# Patient Record
Sex: FEMALE | Race: WHITE | Employment: OTHER | URBAN - NONMETROPOLITAN AREA
[De-identification: names, ages, dates, MRNs, and addresses within clinical notes are randomized per-mention and may not be internally consistent; named-entity substitution may affect disease eponyms.]

---

## 2021-02-19 ENCOUNTER — APPOINTMENT (OUTPATIENT)
Dept: CT IMAGING | Age: 51
End: 2021-02-19
Payer: COMMERCIAL

## 2021-02-19 ENCOUNTER — HOSPITAL ENCOUNTER (EMERGENCY)
Age: 51
Discharge: ANOTHER HOSPITAL | End: 2021-02-19
Attending: FAMILY MEDICINE
Payer: COMMERCIAL

## 2021-02-19 VITALS
WEIGHT: 155 LBS | OXYGEN SATURATION: 99 % | HEART RATE: 58 BPM | RESPIRATION RATE: 20 BRPM | TEMPERATURE: 98.1 F | SYSTOLIC BLOOD PRESSURE: 133 MMHG | DIASTOLIC BLOOD PRESSURE: 70 MMHG

## 2021-02-19 DIAGNOSIS — G89.18 POSTOPERATIVE ABDOMINAL PAIN: ICD-10-CM

## 2021-02-19 DIAGNOSIS — D72.829 LEUKOCYTOSIS, UNSPECIFIED TYPE: Primary | ICD-10-CM

## 2021-02-19 DIAGNOSIS — R10.9 POSTOPERATIVE ABDOMINAL PAIN: ICD-10-CM

## 2021-02-19 LAB
-: ABNORMAL
ABSOLUTE BANDS #: 0.21 K/UL (ref 0–1)
ABSOLUTE EOS #: ABNORMAL K/UL (ref 0–0.4)
ABSOLUTE EOS #: ABNORMAL K/UL (ref 0–0.4)
ABSOLUTE IMMATURE GRANULOCYTE: ABNORMAL K/UL (ref 0–0.3)
ABSOLUTE IMMATURE GRANULOCYTE: ABNORMAL K/UL (ref 0–0.3)
ABSOLUTE LYMPH #: 1.6 K/UL (ref 1–4.8)
ABSOLUTE LYMPH #: 2.08 K/UL (ref 1–4.8)
ABSOLUTE MONO #: 0.6 K/UL (ref 0–1)
ABSOLUTE MONO #: 1.25 K/UL (ref 0–1)
ALBUMIN SERPL-MCNC: 3.9 G/DL (ref 3.5–5.2)
ALBUMIN SERPL-MCNC: 4.4 G/DL (ref 3.5–5.2)
ALBUMIN/GLOBULIN RATIO: ABNORMAL (ref 1–2.5)
ALBUMIN/GLOBULIN RATIO: NORMAL (ref 1–2.5)
ALP BLD-CCNC: 52 U/L (ref 35–104)
ALP BLD-CCNC: 59 U/L (ref 35–104)
ALT SERPL-CCNC: 6 U/L (ref 5–33)
ALT SERPL-CCNC: 6 U/L (ref 5–33)
AMORPHOUS: ABNORMAL
ANION GAP SERPL CALCULATED.3IONS-SCNC: 10 MMOL/L (ref 9–17)
ANION GAP SERPL CALCULATED.3IONS-SCNC: 11 MMOL/L (ref 9–17)
AST SERPL-CCNC: 11 U/L
AST SERPL-CCNC: 13 U/L
BACTERIA: ABNORMAL
BANDS: 1 % (ref 0–10)
BASOPHILS # BLD: ABNORMAL % (ref 0–2)
BASOPHILS # BLD: ABNORMAL % (ref 0–2)
BASOPHILS ABSOLUTE: ABNORMAL K/UL (ref 0–0.2)
BASOPHILS ABSOLUTE: ABNORMAL K/UL (ref 0–0.2)
BILIRUB SERPL-MCNC: 0.46 MG/DL (ref 0.3–1.2)
BILIRUB SERPL-MCNC: 0.55 MG/DL (ref 0.3–1.2)
BILIRUBIN DIRECT: <0.08 MG/DL
BILIRUBIN URINE: NEGATIVE
BILIRUBIN, INDIRECT: NORMAL MG/DL (ref 0–1)
BUN BLDV-MCNC: 17 MG/DL (ref 6–20)
BUN BLDV-MCNC: 19 MG/DL (ref 6–20)
BUN/CREAT BLD: 28 (ref 9–20)
BUN/CREAT BLD: 33 (ref 9–20)
CALCIUM SERPL-MCNC: 9 MG/DL (ref 8.6–10.4)
CALCIUM SERPL-MCNC: 9.7 MG/DL (ref 8.6–10.4)
CASTS UA: ABNORMAL /LPF
CHLORIDE BLD-SCNC: 102 MMOL/L (ref 98–107)
CHLORIDE BLD-SCNC: 104 MMOL/L (ref 98–107)
CO2: 23 MMOL/L (ref 20–31)
CO2: 24 MMOL/L (ref 20–31)
COLOR: YELLOW
COMMENT UA: ABNORMAL
CREAT SERPL-MCNC: 0.51 MG/DL (ref 0.5–0.9)
CREAT SERPL-MCNC: 0.68 MG/DL (ref 0.5–0.9)
CRYSTALS, UA: ABNORMAL /HPF
DIFFERENTIAL TYPE: ABNORMAL
DIFFERENTIAL TYPE: ABNORMAL
EOSINOPHILS RELATIVE PERCENT: ABNORMAL % (ref 0–5)
EOSINOPHILS RELATIVE PERCENT: ABNORMAL % (ref 0–5)
EPITHELIAL CELLS UA: ABNORMAL /HPF
GFR AFRICAN AMERICAN: >60 ML/MIN
GFR AFRICAN AMERICAN: >60 ML/MIN
GFR NON-AFRICAN AMERICAN: >60 ML/MIN
GFR NON-AFRICAN AMERICAN: >60 ML/MIN
GFR SERPL CREATININE-BSD FRML MDRD: ABNORMAL ML/MIN/{1.73_M2}
GLOBULIN: NORMAL G/DL (ref 1.5–3.8)
GLUCOSE BLD-MCNC: 115 MG/DL (ref 70–99)
GLUCOSE BLD-MCNC: 140 MG/DL (ref 70–99)
GLUCOSE URINE: NEGATIVE
HCT VFR BLD CALC: 42 % (ref 36–46)
HCT VFR BLD CALC: 44.9 % (ref 36–46)
HEMOGLOBIN: 14.5 G/DL (ref 12–16)
HEMOGLOBIN: 15.2 G/DL (ref 12–16)
IMMATURE GRANULOCYTES: ABNORMAL %
IMMATURE GRANULOCYTES: ABNORMAL %
KETONES, URINE: ABNORMAL
LACTIC ACID: 0.8 MMOL/L (ref 0.5–2.2)
LACTIC ACID: 1.1 MMOL/L (ref 0.5–2.2)
LEUKOCYTE ESTERASE, URINE: NEGATIVE
LIPASE: 63 U/L (ref 13–60)
LIPASE: 78 U/L (ref 13–60)
LYMPHOCYTES # BLD: 10 % (ref 15–40)
LYMPHOCYTES # BLD: 8 % (ref 15–40)
MAGNESIUM: 1.8 MG/DL (ref 1.6–2.6)
MCH RBC QN AUTO: 29.8 PG (ref 26–34)
MCH RBC QN AUTO: 30.5 PG (ref 26–34)
MCHC RBC AUTO-ENTMCNC: 33.8 G/DL (ref 31–37)
MCHC RBC AUTO-ENTMCNC: 34.6 G/DL (ref 31–37)
MCV RBC AUTO: 87.9 FL (ref 80–100)
MCV RBC AUTO: 88.1 FL (ref 80–100)
MONOCYTES # BLD: 3 % (ref 4–8)
MONOCYTES # BLD: 6 % (ref 4–8)
MORPHOLOGY: ABNORMAL
MORPHOLOGY: ABNORMAL
MUCUS: ABNORMAL
NITRITE, URINE: NEGATIVE
NRBC AUTOMATED: ABNORMAL PER 100 WBC
NRBC AUTOMATED: ABNORMAL PER 100 WBC
OTHER OBSERVATIONS UA: ABNORMAL
PDW BLD-RTO: 13.6 % (ref 12.1–15.2)
PDW BLD-RTO: 13.7 % (ref 12.1–15.2)
PH UA: 6 (ref 5–8)
PLATELET # BLD: 332 K/UL (ref 140–450)
PLATELET # BLD: 403 K/UL (ref 140–450)
PLATELET ESTIMATE: ABNORMAL
PLATELET ESTIMATE: ABNORMAL
PMV BLD AUTO: ABNORMAL FL (ref 6–12)
PMV BLD AUTO: ABNORMAL FL (ref 6–12)
POTASSIUM SERPL-SCNC: 3.3 MMOL/L (ref 3.7–5.3)
POTASSIUM SERPL-SCNC: 3.8 MMOL/L (ref 3.7–5.3)
PROTEIN UA: ABNORMAL
RBC # BLD: 4.78 M/UL (ref 4–5.2)
RBC # BLD: 5.1 M/UL (ref 4–5.2)
RBC # BLD: ABNORMAL 10*6/UL
RBC # BLD: ABNORMAL 10*6/UL
RBC UA: ABNORMAL /HPF (ref 0–2)
RENAL EPITHELIAL, UA: ABNORMAL /HPF
SARS-COV-2, RAPID: NOT DETECTED
SEG NEUTROPHILS: 83 % (ref 47–75)
SEG NEUTROPHILS: 89 % (ref 47–75)
SEGMENTED NEUTROPHILS ABSOLUTE COUNT: 17.26 K/UL (ref 2.5–7)
SEGMENTED NEUTROPHILS ABSOLUTE COUNT: 17.8 K/UL (ref 2.5–7)
SODIUM BLD-SCNC: 137 MMOL/L (ref 135–144)
SODIUM BLD-SCNC: 137 MMOL/L (ref 135–144)
SPECIFIC GRAVITY UA: 1.01 (ref 1–1.03)
SPECIMEN DESCRIPTION: NORMAL
TOTAL PROTEIN: 6.3 G/DL (ref 6.4–8.3)
TOTAL PROTEIN: 7.2 G/DL (ref 6.4–8.3)
TRICHOMONAS: ABNORMAL
TURBIDITY: CLEAR
URINE HGB: ABNORMAL
UROBILINOGEN, URINE: NORMAL
WBC # BLD: 20 K/UL (ref 3.5–11)
WBC # BLD: 20.8 K/UL (ref 3.5–11)
WBC # BLD: ABNORMAL 10*3/UL
WBC # BLD: ABNORMAL 10*3/UL
WBC UA: ABNORMAL /HPF
YEAST: ABNORMAL

## 2021-02-19 PROCEDURE — 80053 COMPREHEN METABOLIC PANEL: CPT

## 2021-02-19 PROCEDURE — 96376 TX/PRO/DX INJ SAME DRUG ADON: CPT

## 2021-02-19 PROCEDURE — C9803 HOPD COVID-19 SPEC COLLECT: HCPCS

## 2021-02-19 PROCEDURE — 80076 HEPATIC FUNCTION PANEL: CPT

## 2021-02-19 PROCEDURE — 81001 URINALYSIS AUTO W/SCOPE: CPT

## 2021-02-19 PROCEDURE — 80048 BASIC METABOLIC PNL TOTAL CA: CPT

## 2021-02-19 PROCEDURE — 2580000003 HC RX 258: Performed by: FAMILY MEDICINE

## 2021-02-19 PROCEDURE — 85025 COMPLETE CBC W/AUTO DIFF WBC: CPT

## 2021-02-19 PROCEDURE — 83605 ASSAY OF LACTIC ACID: CPT

## 2021-02-19 PROCEDURE — 36415 COLL VENOUS BLD VENIPUNCTURE: CPT

## 2021-02-19 PROCEDURE — 6360000004 HC RX CONTRAST MEDICATION: Performed by: FAMILY MEDICINE

## 2021-02-19 PROCEDURE — 96365 THER/PROPH/DIAG IV INF INIT: CPT

## 2021-02-19 PROCEDURE — 96375 TX/PRO/DX INJ NEW DRUG ADDON: CPT

## 2021-02-19 PROCEDURE — 74177 CT ABD & PELVIS W/CONTRAST: CPT

## 2021-02-19 PROCEDURE — 83690 ASSAY OF LIPASE: CPT

## 2021-02-19 PROCEDURE — U0002 COVID-19 LAB TEST NON-CDC: HCPCS

## 2021-02-19 PROCEDURE — 6360000002 HC RX W HCPCS: Performed by: FAMILY MEDICINE

## 2021-02-19 PROCEDURE — 99284 EMERGENCY DEPT VISIT MOD MDM: CPT

## 2021-02-19 PROCEDURE — 83735 ASSAY OF MAGNESIUM: CPT

## 2021-02-19 RX ORDER — FENTANYL CITRATE 50 UG/ML
50 INJECTION, SOLUTION INTRAMUSCULAR; INTRAVENOUS ONCE
Status: COMPLETED | OUTPATIENT
Start: 2021-02-19 | End: 2021-02-19

## 2021-02-19 RX ORDER — FENTANYL CITRATE 50 UG/ML
50 INJECTION, SOLUTION INTRAMUSCULAR; INTRAVENOUS
Status: DISCONTINUED | OUTPATIENT
Start: 2021-02-19 | End: 2021-02-19 | Stop reason: HOSPADM

## 2021-02-19 RX ORDER — ONDANSETRON 2 MG/ML
4 INJECTION INTRAMUSCULAR; INTRAVENOUS ONCE
Status: COMPLETED | OUTPATIENT
Start: 2021-02-19 | End: 2021-02-19

## 2021-02-19 RX ORDER — KETOROLAC TROMETHAMINE 30 MG/ML
30 INJECTION, SOLUTION INTRAMUSCULAR; INTRAVENOUS ONCE
Status: COMPLETED | OUTPATIENT
Start: 2021-02-19 | End: 2021-02-19

## 2021-02-19 RX ORDER — SODIUM CHLORIDE 9 MG/ML
1000 INJECTION, SOLUTION INTRAVENOUS CONTINUOUS
Status: DISCONTINUED | OUTPATIENT
Start: 2021-02-19 | End: 2021-02-19 | Stop reason: HOSPADM

## 2021-02-19 RX ORDER — PANTOPRAZOLE SODIUM 20 MG/1
20 TABLET, DELAYED RELEASE ORAL DAILY
COMMUNITY
Start: 2020-12-29

## 2021-02-19 RX ORDER — PREGABALIN 75 MG/1
75 CAPSULE ORAL 3 TIMES DAILY
COMMUNITY
Start: 2020-12-01 | End: 2021-09-23

## 2021-02-19 RX ORDER — LISINOPRIL 10 MG/1
10 TABLET ORAL DAILY
COMMUNITY
Start: 2020-12-01

## 2021-02-19 RX ADMIN — FENTANYL CITRATE 50 MCG: 50 INJECTION, SOLUTION INTRAMUSCULAR; INTRAVENOUS at 14:28

## 2021-02-19 RX ADMIN — FENTANYL CITRATE 50 MCG: 50 INJECTION, SOLUTION INTRAMUSCULAR; INTRAVENOUS at 12:35

## 2021-02-19 RX ADMIN — KETOROLAC TROMETHAMINE 30 MG: 30 INJECTION, SOLUTION INTRAMUSCULAR at 09:14

## 2021-02-19 RX ADMIN — FENTANYL CITRATE 50 MCG: 50 INJECTION, SOLUTION INTRAMUSCULAR; INTRAVENOUS at 10:18

## 2021-02-19 RX ADMIN — PIPERACILLIN SODIUM,TAZOBACTAM SODIUM 3375 MG: 3; .375 INJECTION, POWDER, FOR SOLUTION INTRAVENOUS at 15:54

## 2021-02-19 RX ADMIN — FENTANYL CITRATE 50 MCG: 50 INJECTION, SOLUTION INTRAMUSCULAR; INTRAVENOUS at 11:13

## 2021-02-19 RX ADMIN — IOPAMIDOL 75 ML: 755 INJECTION, SOLUTION INTRAVENOUS at 10:05

## 2021-02-19 RX ADMIN — FENTANYL CITRATE 50 MCG: 50 INJECTION, SOLUTION INTRAMUSCULAR; INTRAVENOUS at 15:39

## 2021-02-19 RX ADMIN — ONDANSETRON 4 MG: 2 INJECTION INTRAMUSCULAR; INTRAVENOUS at 09:14

## 2021-02-19 RX ADMIN — PIPERACILLIN SODIUM AND TAZOBACTAM SODIUM 3375 MG: 3; .375 INJECTION, POWDER, LYOPHILIZED, FOR SOLUTION INTRAVENOUS at 10:20

## 2021-02-19 RX ADMIN — FENTANYL CITRATE 50 MCG: 50 INJECTION, SOLUTION INTRAMUSCULAR; INTRAVENOUS at 13:28

## 2021-02-19 RX ADMIN — SODIUM CHLORIDE 1000 ML: 9 INJECTION, SOLUTION INTRAVENOUS at 11:02

## 2021-02-19 ASSESSMENT — PAIN DESCRIPTION - PAIN TYPE: TYPE: ACUTE PAIN

## 2021-02-19 ASSESSMENT — ENCOUNTER SYMPTOMS
DIARRHEA: 0
NAUSEA: 1
ABDOMINAL PAIN: 1
VOMITING: 1

## 2021-02-19 ASSESSMENT — PAIN SCALES - GENERAL
PAINLEVEL_OUTOF10: 9
PAINLEVEL_OUTOF10: 9
PAINLEVEL_OUTOF10: 8

## 2021-02-19 NOTE — ED PROVIDER NOTES
[facility]  eMERGENCY dEPARTMENT eNCOUnter          CHIEF COMPLAINT       Chief Complaint   Patient presents with    Abdominal Pain     had an ERCP yesterday at [facility] by Dr. Rene Patrick - pain started at midnight        Nurses Notes reviewed and I agree except as noted in the HPI. HISTORY OF PRESENT ILLNESS    Tian Flores is a 45 y.o. female who presents the emergency room via EMS from home, patient planing of right-sided abdominal pain, onset over the past several hours, with some vomiting, and fever T-max 101.2. Patient rates her pain 10 out of 10, cramping and discomfort, continuous. Patient is POD 1 from ERCP with stent placement at [facility] through Dr. Rene Patrick, patient states she tried to contact his office but provider was not available, and was told to go to the emergency room. PCP: Back  GI: [facility] in [place], [place]      REVIEW OF SYSTEMS     Review of Systems   Constitutional: Positive for fever. Gastrointestinal: Positive for abdominal pain, nausea and vomiting. Negative for diarrhea. All other systems reviewed and are negative. PAST MEDICAL HISTORY    has a past medical history of Anxiety. SURGICAL HISTORY      has a past surgical history that includes Tubal ligation; Hemorrhoid surgery; ERCP; and Hysterectomy. CURRENT MEDICATIONS       Previous Medications    BLACK COHOSH 20 MG TABS    Take 20 mg by mouth 2 times daily    LISINOPRIL (PRINIVIL;ZESTRIL) 10 MG TABLET    Take 10 mg by mouth daily    PANTOPRAZOLE (PROTONIX) 20 MG TABLET    Take 20 mg by mouth daily    PREGABALIN (LYRICA) 75 MG CAPSULE    Take 75 mg by mouth 3 times daily. ALLERGIES     is allergic to morphine; depakote [valproic acid]; sulfamethoxazole-trimethoprim; and vicodin [hydrocodone-acetaminophen]. FAMILY HISTORY     She indicated that the status of her mother is unknown.  She indicated that the status of her father is unknown.   family history includes Diabetes in her father and mother; Heart Disease in her father. SOCIAL HISTORY      reports that she has been smoking. She has been smoking about 0.50 packs per day. She has never used smokeless tobacco. She reports current alcohol use. She reports previous drug use. PHYSICAL EXAM     INITIAL VITALS:  weight is 155 lb (70.3 kg). Her temperature is 98.1 °F (36.7 °C). Her blood pressure is 175/84 (abnormal) and her pulse is 58. Her respiration is 20 and oxygen saturation is 98%. Physical Exam   Constitutional: Patient is oriented to person, place, and time. Patient appears well-developed and well-nourished. Patient is active and cooperative. HENT:   Head: Normocephalic and atraumatic. Head is without contusion. Right Ear: Hearing and external ear normal. No drainage. Left Ear: Hearing and external ear normal. No drainage. Nose: Nose normal. No nasal deformity. No epistaxis. Mouth/Throat: Mucous membranes are not dry. Eyes: EOMI. Conjunctivae, sclera, and lids are normal. Right eye exhibits no discharge. Left eye exhibits no discharge. Neck: Full passive range of motion without pain and phonation normal.   Cardiovascular:  Normal rate, regular rhythm and intact distal pulses. Pulses: Right radial pulse  2+   Pulmonary/Chest: Effort normal. No tachypnea and no bradypnea. Abdominal: Soft, active mild distention of the right side of abdomen, generalized tenderness with voluntary guarding  Musculoskeletal:   Negative acute trauma or deformity,  apparent full range of motion and normal strength all extremities appropriate to age. Neurological: Patient is alert and oriented to person, place, and time. patient displays no tremor. Patient displays no seizure activity. .    Skin: Skin is warm and dry. Patient is not diaphoretic. Psychiatric: Patient has a normal mood and slight anxious affect.  Patient speech is normal and behavior is normal. Cognition and memory are normal.    DIFFERENTIAL DIAGNOSIS:   Abscess, stent migration, cholecystitis, choledocholithiasis, ascending cholangitis,    DIAGNOSTIC RESULTS           RADIOLOGY: non-plain film images(s) such as CT, Ultrasound and MRI are read by the radiologist.  CT ABDOMEN PELVIS W IV CONTRAST Additional Contrast? None   Final Result      The common bile duct is distended, and the stent is seen only in the very    distal portion of the duct. This implies that the stent is not optimally    placed. The appearance of the biliary system is otherwise not unexpected after    the procedure. Scattered free fluid in the right abdomen and minimally in the cul-de-sac. This    is of uncertain etiology and clinical significance. Perforation of the gut as    felt to be unlikely given the lack of any free air. Hepatomegaly with diffuse fatty change in the liver. Mild cardiomegaly.       Left ovarian cyst.                      LABS:   Labs Reviewed   CBC WITH AUTO DIFFERENTIAL - Abnormal; Notable for the following components:       Result Value    WBC 20.8 (*)     Seg Neutrophils 83 (*)     Lymphocytes 10 (*)     Segs Absolute 17.26 (*)     Absolute Mono # 1.25 (*)     All other components within normal limits   BASIC METABOLIC PANEL W/ REFLEX TO MG FOR LOW K - Abnormal; Notable for the following components:    Glucose 140 (*)     Bun/Cre Ratio 28 (*)     All other components within normal limits   LIPASE - Abnormal; Notable for the following components:    Lipase 78 (*)     All other components within normal limits   URINALYSIS - Abnormal; Notable for the following components:    Ketones, Urine SMALL (*)     Urine Hgb 1+ (*)     Protein, UA TRACE (*)     All other components within normal limits   MICROSCOPIC URINALYSIS - Abnormal; Notable for the following components:    Bacteria, UA RARE (*)     Mucus, UA 2+ (*)     All other components within normal limits   COVID-19, RAPID   HEPATIC FUNCTION PANEL   LACTIC ACID       EMERGENCY DEPARTMENT COURSE:   Vitals:    Vitals: 02/19/21 1003 02/19/21 1019 02/19/21 1033 02/19/21 1048   BP: (!) 182/78 (!) 161/87 (!) 177/84 (!) 175/84   Pulse:       Resp:       Temp:       TempSrc:       SpO2: 99% 100% 98% 98%   Weight:         Patient history and physical exam taken at bedside, discussed patient symptoms and exam findings, discussed initial work-up to include blood and urine studies, IV access, and would like to get CT abdomen pelvis this patient is postop day 1 from intra-abdominal procedure. Confirmed allergies, will give Zofran 4 mg IV for nausea, ketorolac 30 mg IV for initial pain, noting patient history of use of Subsolv. Initial lab reviewed noting white blood cell count 20.8 with 83% PMNs no reported bandemia, remaining labs pending    CT abdomen pelvis with IV contrast ordered    Additional labs reviewed, noting SCR 0.68, normal electrolytes, normal Paddock function panel, lipase 78, lactic 1.1    Discussed with patient her initial labs, concern for elevated white blood cell count, that we would be go ahead get the CT, patient acknowledges    Upon return from CT, patient abdominal pain again increasing, we discussed pain medication, patient last dose of subsolve was yesterday, we discussed it is possible she could have a withdrawal reaction we gave her medication here, though patient acknowledges this risk is willing to get medication, reviewed allergies, will give fentanyl 50 mcg IV x1    Normal saline 100 cc an hour maintenance fluids ordered     CT radiology report reviewed    Case was discussed with Dr. Mildred Rahman at [facility], regarding patient's presentation and current work-up including CT findings, advises to have patient transferred to either [facility] or [facility] as there is concern for either early abscess or early perforation, and that surgery at their facility would not be able to handle this.     Discussed with patient my conversation with her GI physician, with recommendation for transfer, patient had a phone call and opted to go to [facility], will contact [facility] for transfer    Case was discussed with [facility] transfer center, regarding patient's presentation and current work-up, follow-up phone call patient is excepted through the ER, the ER accepting is Dr. Krystal Roman, and the GI accepting is Dr. Babar Sheikh. Patient returned from the bathroom, again having severe abdominal pain, did place standing orders for fentanyl 50 mcg every 1 hours as needed moderate to severe abdominal pain. I also placed a time dose for Zosyn 6 hours after initial dosing. Second set of labs reviewed, WBC 20.0 with a 9% PMNs no reported bands, 0.51, , K3.3, lipase 63, lactic 0.8    EMS arrival, given report, we will run the second bag of attics while patient is in route to the next hospital, patient packaged for transport        FINAL IMPRESSION      1. Leukocytosis, unspecified type    2. Postoperative abdominal pain          DISPOSITION/PLAN   trn    PATIENT REFERRED TO:  No follow-up provider specified.     DISCHARGE MEDICATIONS:  New Prescriptions    No medications on file           Summation      Patient Course: trn    ED Medications administered this visit:    Medications   0.9 % sodium chloride infusion (1,000 mLs Intravenous New Bag 2/19/21 1102)   fentaNYL (SUBLIMAZE) injection 50 mcg (50 mcg Intravenous Given 2/19/21 1235)   piperacillin-tazobactam (ZOSYN) 3,375 mg in dextrose 5 % 50 mL IVPB (mini-bag) (has no administration in time range)   ondansetron (ZOFRAN) injection 4 mg (4 mg Intravenous Given 2/19/21 0914)   ketorolac (TORADOL) injection 30 mg (30 mg Intravenous Given 2/19/21 0914)   piperacillin-tazobactam (ZOSYN) 3,375 mg in dextrose 5 % 50 mL IVPB (mini-bag) (0 mg Intravenous Stopped 2/19/21 1055)   iopamidol (ISOVUE-370) 76 % injection 75 mL (75 mLs Intravenous Given 2/19/21 1005)   fentaNYL (SUBLIMAZE) injection 50 mcg (50 mcg Intravenous Given 2/19/21 1018)   fentaNYL (SUBLIMAZE) injection 50 mcg (50 mcg Intravenous Given 2/19/21 1113)       New Prescriptions from this visit:    New Prescriptions    No medications on file       Follow-up:  No follow-up provider specified. Final Impression:   1. Leukocytosis, unspecified type    2.  Postoperative abdominal pain               (Please note that portions of this note were completed with a voice recognition program.  Efforts were made to edit the dictations but occasionally words are mis-transcribed.)    MD Radha Lange MD  02/19/21 6996

## 2021-03-16 ENCOUNTER — HOSPITAL ENCOUNTER (EMERGENCY)
Age: 51
Discharge: ANOTHER HOSPITAL | End: 2021-03-16
Attending: FAMILY MEDICINE
Payer: COMMERCIAL

## 2021-03-16 ENCOUNTER — APPOINTMENT (OUTPATIENT)
Dept: CT IMAGING | Age: 51
End: 2021-03-16
Payer: COMMERCIAL

## 2021-03-16 VITALS
HEART RATE: 94 BPM | BODY MASS INDEX: 24.33 KG/M2 | SYSTOLIC BLOOD PRESSURE: 115 MMHG | HEIGHT: 67 IN | TEMPERATURE: 98.2 F | OXYGEN SATURATION: 97 % | WEIGHT: 155 LBS | DIASTOLIC BLOOD PRESSURE: 70 MMHG | RESPIRATION RATE: 18 BRPM

## 2021-03-16 DIAGNOSIS — K85.90 PHLEGMON OF PANCREAS: Primary | ICD-10-CM

## 2021-03-16 LAB
-: ABNORMAL
ABSOLUTE EOS #: 0.1 K/UL (ref 0–0.4)
ABSOLUTE IMMATURE GRANULOCYTE: ABNORMAL K/UL (ref 0–0.3)
ABSOLUTE LYMPH #: 1.8 K/UL (ref 1–4.8)
ABSOLUTE MONO #: 0.8 K/UL (ref 0–1)
ALBUMIN SERPL-MCNC: 3.8 G/DL (ref 3.5–5.2)
ALBUMIN/GLOBULIN RATIO: ABNORMAL (ref 1–2.5)
ALP BLD-CCNC: 90 U/L (ref 35–104)
ALT SERPL-CCNC: <5 U/L (ref 5–33)
AMORPHOUS: ABNORMAL
ANION GAP SERPL CALCULATED.3IONS-SCNC: 11 MMOL/L (ref 9–17)
AST SERPL-CCNC: 9 U/L
BACTERIA: ABNORMAL
BASOPHILS # BLD: 0 % (ref 0–2)
BASOPHILS ABSOLUTE: 0 K/UL (ref 0–0.2)
BILIRUB SERPL-MCNC: 0.47 MG/DL (ref 0.3–1.2)
BILIRUBIN DIRECT: <0.08 MG/DL
BILIRUBIN URINE: NEGATIVE
BILIRUBIN, INDIRECT: ABNORMAL MG/DL (ref 0–1)
BUN BLDV-MCNC: 14 MG/DL (ref 6–20)
BUN/CREAT BLD: 25 (ref 9–20)
CALCIUM SERPL-MCNC: 9 MG/DL (ref 8.6–10.4)
CASTS UA: ABNORMAL /LPF
CHLORIDE BLD-SCNC: 99 MMOL/L (ref 98–107)
CO2: 26 MMOL/L (ref 20–31)
COLOR: YELLOW
COMMENT UA: ABNORMAL
CREAT SERPL-MCNC: 0.56 MG/DL (ref 0.5–0.9)
CRYSTALS, UA: ABNORMAL /HPF
DIFFERENTIAL TYPE: YES
EOSINOPHILS RELATIVE PERCENT: 0 % (ref 0–5)
EPITHELIAL CELLS UA: ABNORMAL /HPF
GFR AFRICAN AMERICAN: >60 ML/MIN
GFR NON-AFRICAN AMERICAN: >60 ML/MIN
GFR SERPL CREATININE-BSD FRML MDRD: ABNORMAL ML/MIN/{1.73_M2}
GFR SERPL CREATININE-BSD FRML MDRD: ABNORMAL ML/MIN/{1.73_M2}
GLOBULIN: ABNORMAL G/DL (ref 1.5–3.8)
GLUCOSE BLD-MCNC: 103 MG/DL (ref 70–99)
GLUCOSE URINE: NEGATIVE
HCT VFR BLD CALC: 38.8 % (ref 36–46)
HEMOGLOBIN: 13.1 G/DL (ref 12–16)
IMMATURE GRANULOCYTES: ABNORMAL %
KETONES, URINE: NEGATIVE
LACTIC ACID: 0.7 MMOL/L (ref 0.5–2.2)
LEUKOCYTE ESTERASE, URINE: NEGATIVE
LIPASE: 23 U/L (ref 13–60)
LYMPHOCYTES # BLD: 12 % (ref 15–40)
MCH RBC QN AUTO: 29.2 PG (ref 26–34)
MCHC RBC AUTO-ENTMCNC: 33.7 G/DL (ref 31–37)
MCV RBC AUTO: 86.7 FL (ref 80–100)
MONOCYTES # BLD: 5 % (ref 4–8)
MUCUS: ABNORMAL
NITRITE, URINE: NEGATIVE
NRBC AUTOMATED: ABNORMAL PER 100 WBC
OTHER OBSERVATIONS UA: ABNORMAL
PDW BLD-RTO: 13.7 % (ref 12.1–15.2)
PH UA: 5 (ref 5–8)
PLATELET # BLD: 484 K/UL (ref 140–450)
PLATELET ESTIMATE: ABNORMAL
PMV BLD AUTO: ABNORMAL FL (ref 6–12)
POTASSIUM SERPL-SCNC: 4 MMOL/L (ref 3.7–5.3)
PROTEIN UA: NEGATIVE
RBC # BLD: 4.48 M/UL (ref 4–5.2)
RBC # BLD: ABNORMAL 10*6/UL
RBC UA: ABNORMAL /HPF (ref 0–2)
RENAL EPITHELIAL, UA: ABNORMAL /HPF
SARS-COV-2, RAPID: NOT DETECTED
SEG NEUTROPHILS: 83 % (ref 47–75)
SEGMENTED NEUTROPHILS ABSOLUTE COUNT: 12.1 K/UL (ref 2.5–7)
SODIUM BLD-SCNC: 136 MMOL/L (ref 135–144)
SPECIFIC GRAVITY UA: 1.01 (ref 1–1.03)
SPECIMEN DESCRIPTION: NORMAL
TOTAL PROTEIN: 7.5 G/DL (ref 6.4–8.3)
TRICHOMONAS: ABNORMAL
TURBIDITY: CLEAR
URINE HGB: ABNORMAL
UROBILINOGEN, URINE: NORMAL
WBC # BLD: 14.9 K/UL (ref 3.5–11)
WBC # BLD: ABNORMAL 10*3/UL
WBC UA: ABNORMAL /HPF
YEAST: ABNORMAL

## 2021-03-16 PROCEDURE — 74177 CT ABD & PELVIS W/CONTRAST: CPT

## 2021-03-16 PROCEDURE — 96376 TX/PRO/DX INJ SAME DRUG ADON: CPT

## 2021-03-16 PROCEDURE — U0002 COVID-19 LAB TEST NON-CDC: HCPCS

## 2021-03-16 PROCEDURE — 96375 TX/PRO/DX INJ NEW DRUG ADDON: CPT

## 2021-03-16 PROCEDURE — 85025 COMPLETE CBC W/AUTO DIFF WBC: CPT

## 2021-03-16 PROCEDURE — 99285 EMERGENCY DEPT VISIT HI MDM: CPT

## 2021-03-16 PROCEDURE — 83690 ASSAY OF LIPASE: CPT

## 2021-03-16 PROCEDURE — 87086 URINE CULTURE/COLONY COUNT: CPT

## 2021-03-16 PROCEDURE — 80076 HEPATIC FUNCTION PANEL: CPT

## 2021-03-16 PROCEDURE — 6360000002 HC RX W HCPCS: Performed by: FAMILY MEDICINE

## 2021-03-16 PROCEDURE — 96374 THER/PROPH/DIAG INJ IV PUSH: CPT

## 2021-03-16 PROCEDURE — 83605 ASSAY OF LACTIC ACID: CPT

## 2021-03-16 PROCEDURE — 81001 URINALYSIS AUTO W/SCOPE: CPT

## 2021-03-16 PROCEDURE — 80048 BASIC METABOLIC PNL TOTAL CA: CPT

## 2021-03-16 PROCEDURE — 36415 COLL VENOUS BLD VENIPUNCTURE: CPT

## 2021-03-16 PROCEDURE — 6360000004 HC RX CONTRAST MEDICATION: Performed by: FAMILY MEDICINE

## 2021-03-16 PROCEDURE — C9803 HOPD COVID-19 SPEC COLLECT: HCPCS

## 2021-03-16 PROCEDURE — 2580000003 HC RX 258: Performed by: FAMILY MEDICINE

## 2021-03-16 RX ORDER — SODIUM CHLORIDE 9 MG/ML
1000 INJECTION, SOLUTION INTRAVENOUS CONTINUOUS
Status: DISCONTINUED | OUTPATIENT
Start: 2021-03-16 | End: 2021-03-16 | Stop reason: HOSPADM

## 2021-03-16 RX ORDER — FENTANYL CITRATE 50 UG/ML
50 INJECTION, SOLUTION INTRAMUSCULAR; INTRAVENOUS ONCE
Status: COMPLETED | OUTPATIENT
Start: 2021-03-16 | End: 2021-03-16

## 2021-03-16 RX ORDER — FENTANYL CITRATE 50 UG/ML
100 INJECTION, SOLUTION INTRAMUSCULAR; INTRAVENOUS ONCE
Status: COMPLETED | OUTPATIENT
Start: 2021-03-16 | End: 2021-03-16

## 2021-03-16 RX ORDER — FENTANYL CITRATE 50 UG/ML
100 INJECTION, SOLUTION INTRAMUSCULAR; INTRAVENOUS ONCE
Status: DISCONTINUED | OUTPATIENT
Start: 2021-03-16 | End: 2021-03-16

## 2021-03-16 RX ORDER — FENTANYL CITRATE 50 UG/ML
50 INJECTION, SOLUTION INTRAMUSCULAR; INTRAVENOUS
Status: DISCONTINUED | OUTPATIENT
Start: 2021-03-16 | End: 2021-03-16

## 2021-03-16 RX ORDER — 0.9 % SODIUM CHLORIDE 0.9 %
1000 INTRAVENOUS SOLUTION INTRAVENOUS ONCE
Status: COMPLETED | OUTPATIENT
Start: 2021-03-16 | End: 2021-03-16

## 2021-03-16 RX ORDER — ONDANSETRON 2 MG/ML
4 INJECTION INTRAMUSCULAR; INTRAVENOUS ONCE
Status: COMPLETED | OUTPATIENT
Start: 2021-03-16 | End: 2021-03-16

## 2021-03-16 RX ORDER — PREGABALIN 75 MG/1
75 CAPSULE ORAL 3 TIMES DAILY
COMMUNITY
Start: 2020-12-01

## 2021-03-16 RX ADMIN — FENTANYL CITRATE 50 MCG: 50 INJECTION, SOLUTION INTRAMUSCULAR; INTRAVENOUS at 14:19

## 2021-03-16 RX ADMIN — SODIUM CHLORIDE 1000 ML: 9 INJECTION, SOLUTION INTRAVENOUS at 11:19

## 2021-03-16 RX ADMIN — ONDANSETRON 4 MG: 2 INJECTION INTRAMUSCULAR; INTRAVENOUS at 11:22

## 2021-03-16 RX ADMIN — SODIUM CHLORIDE 1000 ML: 9 INJECTION, SOLUTION INTRAVENOUS at 18:29

## 2021-03-16 RX ADMIN — SODIUM CHLORIDE 1000 ML: 9 INJECTION, SOLUTION INTRAVENOUS at 12:52

## 2021-03-16 RX ADMIN — IOPAMIDOL 75 ML: 755 INJECTION, SOLUTION INTRAVENOUS at 13:05

## 2021-03-16 RX ADMIN — FENTANYL CITRATE 100 MCG: 50 INJECTION, SOLUTION INTRAMUSCULAR; INTRAVENOUS at 11:22

## 2021-03-16 RX ADMIN — HYDROMORPHONE HYDROCHLORIDE 1 MG: 1 INJECTION, SOLUTION INTRAMUSCULAR; INTRAVENOUS; SUBCUTANEOUS at 18:34

## 2021-03-16 RX ADMIN — HYDROMORPHONE HYDROCHLORIDE 1 MG: 1 INJECTION, SOLUTION INTRAMUSCULAR; INTRAVENOUS; SUBCUTANEOUS at 21:47

## 2021-03-16 RX ADMIN — HYDROMORPHONE HYDROCHLORIDE 1 MG: 1 INJECTION, SOLUTION INTRAMUSCULAR; INTRAVENOUS; SUBCUTANEOUS at 20:19

## 2021-03-16 RX ADMIN — FENTANYL CITRATE 50 MCG: 50 INJECTION, SOLUTION INTRAMUSCULAR; INTRAVENOUS at 12:12

## 2021-03-16 RX ADMIN — FENTANYL CITRATE 50 MCG: 50 INJECTION, SOLUTION INTRAMUSCULAR; INTRAVENOUS at 16:34

## 2021-03-16 ASSESSMENT — PAIN DESCRIPTION - PAIN TYPE
TYPE: ACUTE PAIN

## 2021-03-16 ASSESSMENT — PAIN SCALES - GENERAL
PAINLEVEL_OUTOF10: 8
PAINLEVEL_OUTOF10: 8
PAINLEVEL_OUTOF10: 10
PAINLEVEL_OUTOF10: 9
PAINLEVEL_OUTOF10: 10
PAINLEVEL_OUTOF10: 10
PAINLEVEL_OUTOF10: 8
PAINLEVEL_OUTOF10: 9

## 2021-03-16 ASSESSMENT — PAIN DESCRIPTION - DESCRIPTORS: DESCRIPTORS: CONSTANT

## 2021-03-16 ASSESSMENT — PAIN DESCRIPTION - PROGRESSION
CLINICAL_PROGRESSION: NOT CHANGED
CLINICAL_PROGRESSION: GRADUALLY WORSENING

## 2021-03-16 ASSESSMENT — ENCOUNTER SYMPTOMS
ABDOMINAL PAIN: 1
VOMITING: 1
DIARRHEA: 0
NAUSEA: 1

## 2021-03-16 ASSESSMENT — PAIN DESCRIPTION - LOCATION
LOCATION: ABDOMEN
LOCATION: ABDOMEN

## 2021-03-16 ASSESSMENT — PAIN DESCRIPTION - FREQUENCY
FREQUENCY: CONTINUOUS
FREQUENCY: CONTINUOUS

## 2021-03-16 NOTE — ED PROVIDER NOTES
[facility]  eMERGENCY dEPARTMENT eNCOUnter          [address]       Chief Complaint   Patient presents with    Abdominal Pain     lower abd pain that is getting worse       Nurses Notes reviewed and I agree except as noted in the HPI. HISTORY OF PRESENT ILLNESS    Cheri Mancilla is a 45 y.o. female who presents to the emergency room via private vehicle, patient planing of abdominal pain decaying across her lower abdomen, nausea and dry heaves. Patient states she had a procedure done 2/18 with a bile duct stent placed at [facility], states that it became displaced she was seen in the emergency room next day and was found that the stent had displaced, patient was transferred to [facility] where she states she received IV fluids, potassium and magnesium, pain medication, states the stent did pass on its own 2/25. Patient states that they now want her to go to [place] for an additional procedure though she states she had not gotten her preop work-up performed as she thought they were supposed to call her. Patient says initially the pain got better though now is getting much worse, rating the pain 10-15/10, indicating primarily through the lower abdomen throughout throughout the entire abdomen, patient states nausea with dry heaves, patient states she is not really had any bowel movement since 2/25 when she passed the stent which she states was very painful. Patient states she tried a suppository without relief. Nothing is helped her pain. PCP: PACO Segura Dr: Ana Ring, [facility]      REVIEW OF SYSTEMS     Review of Systems   Gastrointestinal: Positive for abdominal pain, nausea and vomiting (dry heaves). Negative for diarrhea. All other systems reviewed and are negative. PAST MEDICAL HISTORY    has a past medical history of Anxiety. SURGICAL HISTORY      has a past surgical history that includes Tubal ligation;  Hemorrhoid surgery; ERCP; and Hysterectomy. CURRENT MEDICATIONS       Discharge Medication List as of 3/16/2021  9:50 PM      CONTINUE these medications which have NOT CHANGED    Details   pregabalin (LYRICA) 75 MG capsule Take 75 mg by mouth three times daily. Historical Med      lisinopril (PRINIVIL;ZESTRIL) 10 MG tablet Take 10 mg by mouth dailyHistorical Med      pantoprazole (PROTONIX) 20 MG tablet Take 20 mg by mouth dailyHistorical Med             ALLERGIES     is allergic to morphine; topiramate; depakote [valproic acid]; naproxen; sulfamethoxazole-trimethoprim; and vicodin [hydrocodone-acetaminophen]. FAMILY HISTORY     She indicated that the status of her mother is unknown. She indicated that the status of her father is unknown.   family history includes Diabetes in her father and mother; Heart Disease in her father. SOCIAL HISTORY      reports that she has been smoking. She has been smoking about 0.50 packs per day. She has never used smokeless tobacco. She reports current alcohol use. She reports previous drug use. PHYSICAL EXAM     INITIAL VITALS:  height is 5' 7\" (1.702 m) and weight is 155 lb (70.3 kg). Her temperature is 98.2 °F (36.8 °C). Her blood pressure is 115/70 and her pulse is 94. Her respiration is 18 and oxygen saturation is 97%. Physical Exam   Constitutional: Patient is oriented to person, place, and time. Patient appears well-developed and well-nourished. Patient is active and cooperative. HENT:   Head: Normocephalic and atraumatic. Head is without contusion. Right Ear: Hearing and external ear normal. No drainage. Left Ear: Hearing and external ear normal. No drainage. Nose: Nose normal. No nasal deformity. No epistaxis. Mouth/Throat: Mucous membranes are not dry. Eyes: EOMI. Conjunctivae, sclera, and lids are normal. Right eye exhibits no discharge. Left eye exhibits no discharge.    Neck: Full passive range of motion without pain and phonation normal.   Cardiovascular:  Normal rate, biliary stent. No free air             LABS:   Labs Reviewed   CBC WITH AUTO DIFFERENTIAL - Abnormal; Notable for the following components:       Result Value    WBC 14.9 (*)     Platelets 467 (*)     Seg Neutrophils 83 (*)     Lymphocytes 12 (*)     Segs Absolute 12.10 (*)     All other components within normal limits   BASIC METABOLIC PANEL W/ REFLEX TO MG FOR LOW K - Abnormal; Notable for the following components:    Glucose 103 (*)     Bun/Cre Ratio 25 (*)     All other components within normal limits   HEPATIC FUNCTION PANEL - Abnormal; Notable for the following components:    ALT <5 (*)     All other components within normal limits   URINALYSIS - Abnormal; Notable for the following components:    Urine Hgb 1+ (*)     All other components within normal limits   MICROSCOPIC URINALYSIS - Abnormal; Notable for the following components:    Bacteria, UA 2+ (*)     All other components within normal limits   COVID-19, RAPID   CULTURE, URINE   LACTIC ACID   LIPASE       EMERGENCY DEPARTMENT COURSE:   Vitals:    Vitals:    03/16/21 1603 03/16/21 1837 03/16/21 1903 03/16/21 2131   BP: 118/74 125/70 109/69 115/70   Pulse:       Resp:       Temp:       SpO2: 99% 100% 97%    Weight:       Height:         Patient history and physical exam taken at bedside, discussed patient symptoms and exam findings, discussed initial plan work-up to get IV access, blood and urine studies, will give IV fentanyl and IV Zofran for pain and nausea, IV fluid bolus, and reassess. Would like to get kidney function prior to any scan as patient describes not taking in much orally. Patient sitting semi-Fowlers in bed with significant other at bedside.     OARRS = 580, prescription for subsolve 2/26 for 30-day supply, prescription for oxycodone 5 mg #25 2/24    Fentanyl 100 mcg IV and Zofran 4 mg IV ordered    EMR reviewed, noting [facility] notes, patient was discharged with \"other acute pancreatitis, unspecified complication status\", 2/19-2/24 admission. EMR reviewed, with notes noted for opioid dependence, acute pancreatitis, chronic migraine, patient is a smoker. Initial labs reviewed noting WBC 14.9 with 83% PMNs no reported bands, remaining labs pending    Patient reporting no improvement in her pain, reviewed patient's blood pressure systolic 469, will order additional 50 mcg fentanyl IV x1    Lactic acid 0.7, remaining labs pending    Additional labs reviewed noting normal pack function test, normal lipase    Patient continued pain, additional pain medication ordered    Discussed with patient her current lab findings, I do have concerns for patient's continued abdominal pain, after further discussion we will get CT abdomen pelvis with IV contrast.    Radiology report reviewed    Discussed patient radiology report findings, would like to try to discuss case with her GI group, patient acknowledges    Case was discussed with Dr. Sophie Stanley, [facility] GI, regarding patient's presentation current work-up and a CT findings, advised patient needs transfer to higher care hospital as she is likely need for surgery for this phlegmon, would advise a [facility] trauma [facility] hospital due to the complexity    Case was discussed with Dr. Latoya Dan, surgery at this facility, regarding patient's presentation and work-up including CT findings, also agrees patient should be transferred to higher care facility due to complexity    Discussed the patient her CT findings and my conversations with both GI and surgery, recommendation for transfer, patient states she does not want to go back to [facility], is okay with going to [place] [address], will begin making calls to that effect    Case was discussed with [facility] Hospitalist NP FELIPE Aragon, regarding patient presentation, work-up, she states she has already discussed his case with GI at [place] [address], who feel patient needs to go back to [facility] as they had already started the case on this patient, she also states that they do not have a pancreatic specialist at their facility to handle this case. I discussed with patient my conversation with Bhupendra Ballard, and their recommendation to return through [facility], although patient is is not excited by this idea, she is agreeable at this time. Advised by ARIANNA Stacy that patient medication use of fentanyl has not been effective for pain, will switch to hydromorphone with initial dose ordered    We did have ED  contact [facility], and we are advised that patient is now accepted to [facility] despite me not having spoken to anyone at that facility, as I doubt to excepting physicians in the chart, we are waiting bed assignment    Advised patient that she has been accepted to [facility], awaiting bed assignment and transport, the interim will continue to control patient's pain, I will start patient on maintenance fluids, nausea medication needed, patient acknowledges    Upon arrival, report given EMS crew, patient packaged for transport    FINAL IMPRESSION      1. Phlegmon of pancreas          DISPOSITION/PLAN   trn    PATIENT REFERRED TO:  No follow-up provider specified.     DISCHARGE MEDICATIONS:  Discharge Medication List as of 3/16/2021  9:50 PM              Summation      Patient Course:  trn    ED Medications administered this visit:    Medications   fentaNYL (SUBLIMAZE) injection 100 mcg (100 mcg Intravenous Given 3/16/21 1122)   ondansetron (ZOFRAN) injection 4 mg (4 mg Intravenous Given 3/16/21 1122)   0.9 % sodium chloride bolus (0 mLs Intravenous Stopped 3/16/21 1217)   fentaNYL (SUBLIMAZE) injection 50 mcg (50 mcg Intravenous Given 3/16/21 1212)   0.9 % sodium chloride bolus (0 mLs Intravenous Stopped 3/16/21 1439)   iopamidol (ISOVUE-370) 76 % injection 75 mL (75 mLs Intravenous Given 3/16/21 1305)   fentaNYL (SUBLIMAZE) injection 50 mcg (50 mcg Intravenous Given 3/16/21 1419)   fentaNYL (SUBLIMAZE) injection 50 mcg (50 mcg Intravenous Given 3/16/21 1634)

## 2021-03-17 LAB
CULTURE: NORMAL
Lab: NORMAL
SPECIMEN DESCRIPTION: NORMAL

## 2021-03-17 NOTE — ED NOTES
Called report to Surya Pope at [facility]      [facility] [address]  03/16/21 4358

## 2021-05-18 ENCOUNTER — APPOINTMENT (OUTPATIENT)
Dept: GENERAL RADIOLOGY | Age: 51
End: 2021-05-18
Payer: COMMERCIAL

## 2021-05-18 ENCOUNTER — HOSPITAL ENCOUNTER (EMERGENCY)
Age: 51
Discharge: HOME | End: 2021-05-19
Attending: FAMILY MEDICINE
Payer: COMMERCIAL

## 2021-05-18 DIAGNOSIS — H65.191 ACUTE EFFUSION OF RIGHT EAR: ICD-10-CM

## 2021-05-18 DIAGNOSIS — R11.2 NON-INTRACTABLE VOMITING WITH NAUSEA, UNSPECIFIED VOMITING TYPE: ICD-10-CM

## 2021-05-18 DIAGNOSIS — G43.909 MIGRAINE WITHOUT STATUS MIGRAINOSUS, NOT INTRACTABLE, UNSPECIFIED MIGRAINE TYPE: ICD-10-CM

## 2021-05-18 DIAGNOSIS — H61.21 IMPACTED CERUMEN, RIGHT EAR: ICD-10-CM

## 2021-05-18 DIAGNOSIS — J20.9 ACUTE BRONCHITIS, UNSPECIFIED ORGANISM: Primary | ICD-10-CM

## 2021-05-18 DIAGNOSIS — Z20.822 LAB TEST NEGATIVE FOR COVID-19 VIRUS: ICD-10-CM

## 2021-05-18 PROCEDURE — 99285 EMERGENCY DEPT VISIT HI MDM: CPT

## 2021-05-18 PROCEDURE — 96375 TX/PRO/DX INJ NEW DRUG ADDON: CPT

## 2021-05-18 PROCEDURE — 96374 THER/PROPH/DIAG INJ IV PUSH: CPT

## 2021-05-18 PROCEDURE — 6360000002 HC RX W HCPCS: Performed by: FAMILY MEDICINE

## 2021-05-18 PROCEDURE — 69209 REMOVE IMPACTED EAR WAX UNI: CPT

## 2021-05-18 PROCEDURE — 87635 SARS-COV-2 COVID-19 AMP PRB: CPT

## 2021-05-18 PROCEDURE — 71045 X-RAY EXAM CHEST 1 VIEW: CPT

## 2021-05-18 RX ORDER — METHYLPREDNISOLONE SODIUM SUCCINATE 125 MG/2ML
125 INJECTION, POWDER, LYOPHILIZED, FOR SOLUTION INTRAMUSCULAR; INTRAVENOUS ONCE
Status: COMPLETED | OUTPATIENT
Start: 2021-05-18 | End: 2021-05-18

## 2021-05-18 RX ORDER — KETOROLAC TROMETHAMINE 30 MG/ML
30 INJECTION, SOLUTION INTRAMUSCULAR; INTRAVENOUS ONCE
Status: COMPLETED | OUTPATIENT
Start: 2021-05-18 | End: 2021-05-18

## 2021-05-18 RX ORDER — METOCLOPRAMIDE HYDROCHLORIDE 5 MG/ML
10 INJECTION INTRAMUSCULAR; INTRAVENOUS ONCE
Status: COMPLETED | OUTPATIENT
Start: 2021-05-18 | End: 2021-05-18

## 2021-05-18 RX ORDER — OMEPRAZOLE 20 MG/1
20 CAPSULE, DELAYED RELEASE ORAL DAILY
COMMUNITY

## 2021-05-18 RX ADMIN — KETOROLAC TROMETHAMINE 30 MG: 30 INJECTION, SOLUTION INTRAMUSCULAR; INTRAVENOUS at 23:38

## 2021-05-18 RX ADMIN — METOCLOPRAMIDE 10 MG: 5 INJECTION, SOLUTION INTRAMUSCULAR; INTRAVENOUS at 23:38

## 2021-05-18 RX ADMIN — METHYLPREDNISOLONE SODIUM SUCCINATE 125 MG: 125 INJECTION, POWDER, FOR SOLUTION INTRAMUSCULAR; INTRAVENOUS at 23:37

## 2021-05-18 ASSESSMENT — ENCOUNTER SYMPTOMS: COUGH: 1

## 2021-05-18 ASSESSMENT — PAIN SCALES - GENERAL: PAINLEVEL_OUTOF10: 8

## 2021-05-19 VITALS
TEMPERATURE: 98.6 F | BODY MASS INDEX: 23.62 KG/M2 | WEIGHT: 150.5 LBS | HEART RATE: 92 BPM | OXYGEN SATURATION: 96 % | SYSTOLIC BLOOD PRESSURE: 127 MMHG | HEIGHT: 67 IN | DIASTOLIC BLOOD PRESSURE: 93 MMHG | RESPIRATION RATE: 20 BRPM

## 2021-05-19 LAB
SARS-COV-2, RAPID: NOT DETECTED
SPECIMEN DESCRIPTION: NORMAL

## 2021-05-19 RX ORDER — ONDANSETRON 4 MG/1
4 TABLET, ORALLY DISINTEGRATING ORAL
Qty: 15 TABLET | Refills: 0 | Status: SHIPPED | OUTPATIENT
Start: 2021-05-19

## 2021-05-19 RX ORDER — GUAIFENESIN AND DEXTROMETHORPHAN HYDROBROMIDE 1200; 60 MG/1; MG/1
1 TABLET, EXTENDED RELEASE ORAL EVERY 12 HOURS PRN
Qty: 28 TABLET | Refills: 0 | Status: SHIPPED | OUTPATIENT
Start: 2021-05-19

## 2021-05-19 ASSESSMENT — PAIN DESCRIPTION - LOCATION: LOCATION: HEAD

## 2021-05-19 ASSESSMENT — PAIN SCALES - GENERAL: PAINLEVEL_OUTOF10: 4

## 2021-05-19 NOTE — PROGRESS NOTES
Discharge instructions reviewed. Pt verbalizes understanding. Aware to  medications at [facility] tomorrow. All questions answered.

## 2021-05-19 NOTE — ED PROVIDER NOTES
[facility]  eMERGENCY dEPARTMENT eNCOUnter          [facility]       Chief Complaint   Patient presents with    Nausea     Pt states has been sick for about 3 weeks. Daughter recently had covid. Can't keep anything down. Chills, no fever. Headache, cough, ear pain. Nurses Notes reviewed and I agree except as noted in the HPI. HISTORY OF PRESENT ILLNESS    Emily Valentino is a 45 y.o. female who presents to the emergency room via private vehicle, patient complaining of congestion, right ear pain, cough, subjective fever chills, vomiting not related to coughing, migraine headache. Patient states daughter recently got over Covid and she may have had some exposure. Patient is a smoker though denies any history of COPD or asthma. Denies any diarrhea. Last dose Tylenol proximally 10 hours ago. Patient states nothing helped her symptoms. PCP: PACO Reynolds    REVIEW OF SYSTEMS     Review of Systems   Constitutional: Positive for chills and fever. HENT: Positive for congestion, ear pain (right), hearing loss (right) and postnasal drip. Respiratory: Positive for cough. Neurological: Positive for headaches. All other systems reviewed and are negative. PAST MEDICAL HISTORY    has a past medical history of Anxiety. SURGICAL HISTORY      has a past surgical history that includes Tubal ligation; Hemorrhoid surgery; ERCP; and Hysterectomy. CURRENT MEDICATIONS       Previous Medications    LISINOPRIL (PRINIVIL;ZESTRIL) 10 MG TABLET    Take 10 mg by mouth daily    OMEPRAZOLE (PRILOSEC) 20 MG DELAYED RELEASE CAPSULE    Take 20 mg by mouth daily    PANTOPRAZOLE (PROTONIX) 20 MG TABLET    Take 20 mg by mouth daily    PREGABALIN (LYRICA) 75 MG CAPSULE    Take 75 mg by mouth 3 times daily. PREGABALIN (LYRICA) 75 MG CAPSULE    Take 75 mg by mouth three times daily.        ALLERGIES     is allergic to morphine, topiramate, depakote [valproic acid], naproxen, sulfamethoxazole-trimethoprim, and vicodin [hydrocodone-acetaminophen]. FAMILY HISTORY     She indicated that the status of her mother is unknown. She indicated that the status of her father is unknown.   family history includes Diabetes in her father and mother; Heart Disease in her father. SOCIAL HISTORY      reports that she has been smoking. She has been smoking about 0.50 packs per day. She has never used smokeless tobacco. She reports current alcohol use. She reports previous drug use. PHYSICAL EXAM     INITIAL VITALS:  height is 5' 7\" (1.702 m) and weight is 150 lb 8 oz (68.3 kg). Her oral temperature is 98.6 °F (37 °C). Her blood pressure is 145/88 (abnormal) and her pulse is 92. Her respiration is 20 and oxygen saturation is 100%. Physical Exam   Constitutional: Patient is oriented to person, place, and time. Patient appears well-developed and well-nourished. Patient is active and cooperative. HENT:   Head: Normocephalic and atraumatic. Head is without contusion. Right Ear: Hearing and external ear normal. No drainage. Left Ear: Hearing and external ear normal. No drainage. Nose: Nose normal. No nasal deformity. No epistaxis. Mouth/Throat: Mucous membranes are not dry. Negative oral lesions or exudate, noted large amount of postnasal drainage on the left, mild posterior pharyngeal erythema and cobblestoning  Eyes: EOMI. Conjunctivae, sclera, and lids are normal. Right eye exhibits no discharge. Left eye exhibits no discharge. Neck: Full passive range of motion without pain and phonation normal.   Cardiovascular:  Normal rate, regular rhythm and intact distal pulses. Pulses: Right radial pulse  2+   Pulmonary/Chest: Effort normal. No tachypnea and no bradypnea. No wheezes, rhonchi, or rales. Abdominal: Soft.  Patient without distension or tenderness  Musculoskeletal:   Negative acute trauma or deformity,  apparent full range of motion and normal strength all extremities appropriate to age. Neurological: Patient is alert and oriented to person, place, and time. patient displays no tremor. Patient displays no seizure activity. .  Lymphatic: No cervical lymphadenopathy  Skin: Skin is warm and dry. Patient is not diaphoretic. Psychiatric: Patient has a normal mood and affect. Patient speech is normal and behavior is normal. Cognition and memory are normal.    DIFFERENTIAL DIAGNOSIS:   Pneumonia bronchitis URI, ear effusion, OM, migraine NOS viral illness NOS    DIAGNOSTIC RESULTS           RADIOLOGY: non-plain film images(s) such as CT, Ultrasound and MRI are read by the radiologist.  XR CHEST PORTABLE   Final Result      No acute cardiopulmonary process. FOLLOW-UP: Follow-up as clinically indicated. LABS:   Labs Reviewed   COVID-19, RAPID       EMERGENCY DEPARTMENT COURSE:   Vitals:    Vitals:    05/18/21 2322 05/18/21 2332   BP:  (!) 145/88   Pulse: 92    Resp: 20    Temp: 98.6 °F (37 °C)    TempSrc: Oral    SpO2: 100%    Weight: 150 lb 8 oz (68.3 kg)    Height: 5' 7\" (1.702 m)      Patient history and physical exam taken at bedside, discussed patient symptoms and exam findings, discussed initial work-up to include chest x-ray, Covid test, ear irrigation, will get IV access and after confirming allergies will give IV ketorolac IV Reglan IV Solu-Medrol and reevaluate.   Patient sitting high semi-Fowlers in bed, acknowledges    OARRS = 580, with regular prescriptions for as buprenorphine [facility] provider [facility], last narcotic prescription oxycodone 30 tablets 3/20/2021    Chest x-ray reviewed, radiology report reviewed    Patient right ear irrigated by nursing, evaluated post irrigation showing effusion with otherwise intact TM, no erythema or bulging    Rapid Covid test negative    Discussed with patient diagnosis acute bronchitis, discussed this is a viral illness, with expectation of symptoms up to 4 to 5 weeks in smokers, discussed as a virus antibiotics would not be effective in treatment, incidentally shown that steroids and albuterol treatments also have little to no effect versus the risk of these medications in a patient that does not have asthma or COPD or other underlying respiratory diseases. We discussed OTC cough cold medications which may have some benefit such as Mucinex DM or similar. Discussed patient's pain her right ear ear is an effusion, being fluid behind the ear, however this is not an infection and will take time to go away. Discussed patient's migraine headache, which has improved from a baseline 8 out of 10 to a current 6/10, discussed treatments for migraines at home including product such as Excedrin, but advised patient that she drinks a lot of caffeine did not cold abruptly away from this as this can also cause significant headaches, if she decides to get off caffeine to slowly withdraw from it. Patient is currently buprenorphine, and for this reason amount of stay away from Fioricet abortive medications for migraines, patient will be discharged home with follow-up with her established primary care, return to ER if any symptoms change worsen or other concerns, patient acknowledges. FINAL IMPRESSION      1. Acute bronchitis, unspecified organism    2. Acute effusion of right ear    3. Migraine without status migrainosus, not intractable, unspecified migraine type    4. Non-intractable vomiting with nausea, unspecified vomiting type    5. Impacted cerumen, right ear    6.  Lab test negative for COVID-19 virus          DISPOSITION/PLAN   D/c    PATIENT REFERRED TO:  KAI Vivas - CNP  [facility] [address]  [phone]    Call       [facility]P [facility] ED  [address] [facility] [address] [phone]    If symptoms worsen, As needed      DISCHARGE MEDICATIONS:  New Prescriptions    No medications on file           Summation      Patient Course: d/c    ED Medications administered this visit: Medications   ketorolac (TORADOL) injection 30 mg (30 mg Intravenous Given 5/18/21 2338)   metoclopramide (REGLAN) injection 10 mg (10 mg Intravenous Given 5/18/21 2338)   methylPREDNISolone sodium (SOLU-MEDROL) injection 125 mg (125 mg Intravenous Given 5/18/21 2337)       New Prescriptions from this visit:    New Prescriptions    No medications on file       Follow-up:  Patti Poon, APRN - CNP  [place] [address]  [phone]    Call       HOSP [facility] ED  [address] [place] [address]  [phone]    If symptoms worsen, As needed        Final Impression:   1. Acute bronchitis, unspecified organism    2. Acute effusion of right ear    3. Migraine without status migrainosus, not intractable, unspecified migraine type    4. Non-intractable vomiting with nausea, unspecified vomiting type    5. Impacted cerumen, right ear    6.  Lab test negative for COVID-19 virus               (Please note that portions of this note were completed with a voice recognition program.  Efforts were made to edit the dictations but occasionally words are mis-transcribed.)    MD Sanket Cam MD  05/19/21 6519

## 2021-05-19 NOTE — PROGRESS NOTES
Ear wax removal completed. Small ball of wax returned. Pt tolerated well. Dr Danya Ulloa at bedside. Denies further needs.

## 2021-07-27 ENCOUNTER — APPOINTMENT (OUTPATIENT)
Dept: GENERAL RADIOLOGY | Age: 51
End: 2021-07-27
Payer: COMMERCIAL

## 2021-07-27 ENCOUNTER — HOSPITAL ENCOUNTER (EMERGENCY)
Age: 51
Discharge: HOME | End: 2021-07-27
Attending: INTERNAL MEDICINE
Payer: COMMERCIAL

## 2021-07-27 VITALS
DIASTOLIC BLOOD PRESSURE: 91 MMHG | BODY MASS INDEX: 23.07 KG/M2 | HEART RATE: 96 BPM | WEIGHT: 147 LBS | OXYGEN SATURATION: 96 % | RESPIRATION RATE: 20 BRPM | HEIGHT: 67 IN | TEMPERATURE: 98.9 F | SYSTOLIC BLOOD PRESSURE: 122 MMHG

## 2021-07-27 DIAGNOSIS — S92.301A CLOSED NONDISPLACED FRACTURE OF METATARSAL BONE OF RIGHT FOOT, UNSPECIFIED METATARSAL, INITIAL ENCOUNTER: ICD-10-CM

## 2021-07-27 DIAGNOSIS — S92.501A CLOSED FRACTURE OF PHALANX OF RIGHT FIFTH TOE, INITIAL ENCOUNTER: Primary | ICD-10-CM

## 2021-07-27 DIAGNOSIS — Z72.0 TOBACCO ABUSE: ICD-10-CM

## 2021-07-27 PROCEDURE — 99283 EMERGENCY DEPT VISIT LOW MDM: CPT

## 2021-07-27 PROCEDURE — 73630 X-RAY EXAM OF FOOT: CPT

## 2021-07-27 RX ORDER — KETOROLAC TROMETHAMINE 30 MG/ML
60 INJECTION, SOLUTION INTRAMUSCULAR; INTRAVENOUS ONCE
Status: DISCONTINUED | OUTPATIENT
Start: 2021-07-27 | End: 2021-07-27

## 2021-07-27 RX ORDER — IBUPROFEN 600 MG/1
600 TABLET ORAL 4 TIMES DAILY PRN
Qty: 40 TABLET | Refills: 0 | Status: SHIPPED | OUTPATIENT
Start: 2021-07-27

## 2021-07-27 ASSESSMENT — PAIN DESCRIPTION - LOCATION: LOCATION: FOOT

## 2021-07-27 ASSESSMENT — PAIN SCALES - GENERAL: PAINLEVEL_OUTOF10: 8

## 2021-07-27 ASSESSMENT — PAIN DESCRIPTION - DESCRIPTORS: DESCRIPTORS: THROBBING

## 2021-07-27 NOTE — ED PROVIDER NOTES
[facility] ED  EMERGENCY DEPARTMENT ENCOUNTER      Pt Name: Gunnar Sandhu  MRN: [id number]  [place] 1970  Date of evaluation: 7/27/2021  Provider: Sheran Huron, MD Severo Dolin       Chief Complaint   Patient presents with    Foot Pain     States injured her fourth and fifth toe on right foot and states painful in right foot also. HISTORY OF PRESENT ILLNESS   (Location/Symptom, Timing/Onset, Context/Setting, Quality, Duration, Modifying Factors, Severity)  Note limiting factors. Gunnar Sandhu is a 45 y.o. female who has a history of hemorrhoids, hysterectomy, tobacco abuse presents to the emergency department for evaluation and management of right fourth and fifth toe injuries. She stated that she stubbed her toe on a tree trunk that was sticking out of the ground at the [employer] that she is attending. Her fifth toe was bent laterally which she pushed back into place. She states that her third toe is tingling. She has not tried thing for her symptoms. She has not been seen by any other providers for it. This patient is being evaluated and treated during the COVID-19 pandemic. There is an area wide shortage of hospital beds. HPI    Nursing Notes were reviewed. REVIEW OF SYSTEMS    (2-9 systems for level 4, 10 or more for level 5)       REVIEW OF SYSTEMS    Constitutional: Negative for fatigue and fever. Cardiovascular: Negative for chest pain, palpitations and leg swelling. Gastrointestinal: Negative for abdominal distention, abdominal pain, diarrhea, nausea and vomiting. Musculoskeletal: Positive for right foot pain over the fourth and fifth toes, dislocation of the fifth toe,, negative for arthralgias, back pain and neck pain. Skin: Negative for wound, laceration, color change, pallor, rash   Allergic/Immunologic: Negative for environmental allergies and food allergies.    Neurological: Positive for tingling right third toe, negative fordizziness, speech difficulty, weakness, and headaches. Hematological: Negative for adenopathy. Does not bruise/bleed easily. Except as noted above the remainder of the review of systems was reviewed and negative. PASTMEDICAL HISTORY     Past Medical History:   Diagnosis Date    Anxiety          SURGICAL HISTORY       Past Surgical History:   Procedure Laterality Date    ERCP      HEMORRHOID SURGERY      HYSTERECTOMY      TUBAL LIGATION           CURRENT MEDICATIONS       Previous Medications    DEXTROMETHORPHAN-GUAIFENESIN (MUCINEX DM MAXIMUM STRENGTH)  MG TB12    Take 1 tablet by mouth every 12 hours as needed (cough congestion) Pharmacist: May substitute as needed for insurance purposes    LISINOPRIL (PRINIVIL;ZESTRIL) 10 MG TABLET    Take 10 mg by mouth daily    OMEPRAZOLE (PRILOSEC) 20 MG DELAYED RELEASE CAPSULE    Take 20 mg by mouth daily    ONDANSETRON (ZOFRAN ODT) 4 MG DISINTEGRATING TABLET    Take 1 tablet by mouth every 4-6 hours as needed for Nausea or Vomiting    PANTOPRAZOLE (PROTONIX) 20 MG TABLET    Take 20 mg by mouth daily    PREGABALIN (LYRICA) 75 MG CAPSULE    Take 75 mg by mouth 3 times daily. PREGABALIN (LYRICA) 75 MG CAPSULE    Take 75 mg by mouth three times daily.        ALLERGIES     Morphine, Topiramate, Depakote [valproic acid], Naproxen, Sulfamethoxazole-trimethoprim, and Vicodin [hydrocodone-acetaminophen]    FAMILY HISTORY       Family History   Problem Relation Age of Onset    Diabetes Mother     Heart Disease Father     Diabetes Father           SOCIAL HISTORY       Social History     Socioeconomic History    Marital status: Legally      Spouse name: None    Number of children: None    Years of education: None    Highest education level: None   Occupational History    None   Tobacco Use    Smoking status: Current Every Day Smoker     Packs/day: 0.50    Smokeless tobacco: Never Used   Substance and Sexual Activity    Alcohol use: Yes     Comment: Occasionally  Drug use: Not Currently    Sexual activity: None   Other Topics Concern    None   Social History Narrative    None     Social Determinants of Health     Financial Resource Strain:     Difficulty of Paying Living Expenses:    Food Insecurity:     Worried About Running Out of Food in the Last Year:     Ran Out of Food in the Last Year:    Transportation Needs:     Lack of Transportation (Medical):  Lack of Transportation (Non-Medical):    Physical Activity:     Days of Exercise per Week:     Minutes of Exercise per Session:    Stress:     Feeling of Stress :    Social Connections:     Frequency of Communication with Friends and Family:     Frequency of Social Gatherings with Friends and Family:     Attends [employer] Services:     Active Member of Clubs or Organizations:     Attends Club or Organization Meetings:     Marital Status:    Intimate Partner Violence:     Fear of Current or Ex-Partner:     Emotionally Abused:     Physically Abused:     Sexually Abused:        SCREENINGS    [place] Coma Scale  Eye Opening: Spontaneous  Best Verbal Response: Oriented  Best Motor Response: Obeys commands  [place] Coma Scale Score: 15        PHYSICAL EXAM    (up to 7 for level 4, 8 or more for level 5)     ED Triage Vitals [07/27/21 1343]   BP Temp Temp Source Pulse Resp SpO2 Height Weight   (!) 122/91 98.9 °F (37.2 °C) Temporal 96 20 96 % 5' 7\" (1.702 m) 147 lb (66.7 kg)       Physical Exam  Physical Exam   Constitutional:  Appears well, well-developed and well-nourished. No distress noted. Non toxic in appearance  HENT:     Head: Normocephalic and atraumatic. No palpable tenderness. Nose: Nose normal.    Mouth/Throat: Oropharynx is clear and mucosa moist.   Eyes: Conjunctivae and EOM are normal. Pupils are equal, round, and reactive to light. No scleral icterus. No tearing or drainage. Neck: Normal range of motion. Neck supple. No tracheal deviation present.    Cardiovascular: Normal rate, regular rhythm, normal heart sounds and intact distal pulses. Exam reveals no gallop or friction rub. No murmur heard. Pulmonary/Chest: Effort normal and breath sounds are symmetric and normal. No respiratory distress. There are no wheezes, rales or rhonchi. Abdominal: Soft. Bowel sounds are normal. No distension or no mass exhibitted. No organomegaly. There is no tenderness, rebound, rigidity or guarding. Genitourinary:   No CVA tenderness noted on examination. Musculoskeletal: Examination of the foot demonstrates marked tenderness over the right third fourth and fifth distal metatarsals and phalanges extending over the lateral aspect down the shaft of the fifth metatarsal.  No deformities are noted. There is slight ecchymosis developing in the web between the fourth and fifth toe. Limited range of motion of the right toes due to pain in the third, fourth and fifth toes. The toenails are intact. No open wounds noted. Examination of the ankle shows no tenderness of the lateral or medial malleoli. There is no tenderness of the proximal tib-fib. Normal range of motion of all extremities and and torso except for the right toes. Lymphadenopathy:  No cervical adenopathy. Neurological:   Alert and oriented to person, place, and time. Reflexes are normal.  There are no cranial nerve deficits. Normal muscle tone, motor and sensory function exhibitted. Strength 5/5 in all extremities and torso. Coordination and gait normal.   Skin: Skin is warm and dry. No rash noted. No diaphoresis. No erythema. No pallor. Psychiatric:  normal mood and affect. Behavior is  normal. Judgment and thought content normal.     DIAGNOSTIC RESULTS     EKG: All EKG's are interpreted by the Emergency Department Physician who either signs or Co-signs this chart in the absence of a cardiologist.    Not indicated.     RADIOLOGY:   Non-plain film images such as CT, Ultrasoundand MRI are read by the radiologist. Plain radiographic images are visualized and preliminarily interpreted by the emergency physician with the below findings:    ED interpretation: fx prox phalanx right 5th toe, question cortical disruption of distal shaft of 5th MT seen only on one view with a subtle extending nondisplaced oblique fracture. Interpretation per the Radiologist below, if available at the time of this note:    XR FOOT RIGHT (MIN 3 VIEWS)   Final Result      Fracture proximal phalanx right fifth digit extending to the articular surface    at the PIP joint. ED BEDSIDE ULTRASOUND:   Performed by ED Physician - none    LABS:  Labs Reviewed - No data to display    All other labs were within normal range or not returned as of this dictation. EMERGENCY DEPARTMENT COURSE and DIFFERENTIAL DIAGNOSIS/MDM:   Vitals:    Vitals:    07/27/21 1343   BP: (!) 122/91   Pulse: 96   Resp: 20   Temp: 98.9 °F (37.2 °C)   TempSrc: Temporal   SpO2: 96%   Weight: 147 lb (66.7 kg)   Height: 5' 7\" (1.702 m)       Noted    MDM    CRITICAL CARE TIME   Total Critical Care time was 0 minutes. EDCOURSE       CONSULTS:  None    PROCEDURES:  Unless otherwise noted below, none     Procedures      Summation      Lyudmila Guzman is a 45 y.o. female who has a history of hemorrhoids, hysterectomy, tobacco abuse presented for right fifth toe fracture after blunt force injury. There is question of cortical disruption of the distal shaft of the fifth metatarsal from a nondisplaced oblique fracture. She was placed in a walking boot, given crutches for ambulatory assistance and advised to use ibuprofen for pain control. She is using 800 mg I advised her to cut that in half or ascertain that she has food in her stomach to protect the lining from NSAID gastritis. RICE was also discussed. She is otherwise well, well hydrated, nontoxic, hemodynamically stable, neurologically intact, and satisfactory for discharge for outpatient management.      Findings discussed at length with patient. I gave her ample opportunity to ask questions. Weightbearing as tolerated. I instructed the patient to followup with Dr. Lissa Levine for evaluation of response to management of acute injury, and with her PCP for management of tobacco abuse. I instructed the patient to return to the ER if her condition worsens, if there is any concern for altered mental status, difficulty breathing, dehydration or loss of function. ED Medicationsadministered this visit:    Medications - No data to display    New Prescriptions from this visit:    New Prescriptions    IBUPROFEN (ADVIL;MOTRIN) 600 MG TABLET    Take 1 tablet by mouth 4 times daily as needed for Pain       Follow-up:  [facility]  [address] [place] [address]  Go to   As needed, If symptoms worsen    Hany Woodruff, APRN - [address] [facility] [address] [place] [id number]    Schedule an appointment as soon as possible for a visit in 1 week  As needed, If symptoms worsen    Octavia Slaughter MD  [address] [place] [address]  [phone]    Schedule an appointment as soon as possible for a visit in 2 days          Final Impression:   1. Closed fracture of phalanx of right fifth toe, initial encounter    2. Closed nondisplaced fracture of metatarsal bone of right foot, unspecified metatarsal, initial encounter    3. Tobacco abuse               (Please note that portions of this note werecompleted with a voice recognition program.  Efforts were made to edit the dictations but occasionally words are mis-transcribed.)    FINAL IMPRESSION      1. Closed fracture of phalanx of right fifth toe, initial encounter    2. Closed nondisplaced fracture of metatarsal bone of right foot, unspecified metatarsal, initial encounter    3.  Tobacco abuse          DISPOSITION/PLAN   DISPOSITION        PATIENT REFERRED TO:  [facility] ED  [address] [place] [address]  Go

## 2021-08-23 ENCOUNTER — HOSPITAL ENCOUNTER (OUTPATIENT)
Dept: PREADMISSION TESTING | Age: 51
Setting detail: SPECIMEN
Discharge: HOME | End: 2021-08-23
Payer: COMMERCIAL

## 2021-08-23 PROCEDURE — 87635 SARS-COV-2 COVID-19 AMP PRB: CPT

## 2021-09-23 ENCOUNTER — HOSPITAL ENCOUNTER (EMERGENCY)
Age: 51
Discharge: HOME | End: 2021-09-23
Attending: EMERGENCY MEDICINE
Payer: COMMERCIAL

## 2021-09-23 VITALS
WEIGHT: 145 LBS | OXYGEN SATURATION: 100 % | HEIGHT: 67 IN | DIASTOLIC BLOOD PRESSURE: 86 MMHG | RESPIRATION RATE: 16 BRPM | BODY MASS INDEX: 22.76 KG/M2 | SYSTOLIC BLOOD PRESSURE: 140 MMHG | HEART RATE: 78 BPM | TEMPERATURE: 98.6 F

## 2021-09-23 DIAGNOSIS — U07.1 COVID-19: Primary | ICD-10-CM

## 2021-09-23 LAB
-: NORMAL
AMORPHOUS: NORMAL
BACTERIA: NORMAL
BILIRUBIN URINE: NEGATIVE
CASTS UA: NORMAL /LPF
COLOR: YELLOW
COMMENT UA: ABNORMAL
CRYSTALS, UA: NORMAL /HPF
EPITHELIAL CELLS UA: NORMAL /HPF
GLUCOSE URINE: NEGATIVE
KETONES, URINE: NEGATIVE
LEUKOCYTE ESTERASE, URINE: NEGATIVE
MUCUS: NORMAL
NITRITE, URINE: NEGATIVE
OTHER OBSERVATIONS UA: NORMAL
PH UA: 6.5 (ref 5–8)
PROTEIN UA: ABNORMAL
RBC UA: NORMAL /HPF (ref 0–2)
RENAL EPITHELIAL, UA: NORMAL /HPF
SARS-COV-2, RAPID: DETECTED
SPECIFIC GRAVITY UA: 1.02 (ref 1–1.03)
SPECIMEN DESCRIPTION: ABNORMAL
TRICHOMONAS: NORMAL
TURBIDITY: CLEAR
URINE HGB: ABNORMAL
UROBILINOGEN, URINE: NORMAL
WBC UA: NORMAL /HPF
YEAST: NORMAL

## 2021-09-23 PROCEDURE — 81001 URINALYSIS AUTO W/SCOPE: CPT

## 2021-09-23 PROCEDURE — 99283 EMERGENCY DEPT VISIT LOW MDM: CPT

## 2021-09-23 PROCEDURE — C9803 HOPD COVID-19 SPEC COLLECT: HCPCS

## 2021-09-23 PROCEDURE — 87635 SARS-COV-2 COVID-19 AMP PRB: CPT

## 2021-09-23 ASSESSMENT — PAIN SCALES - GENERAL: PAINLEVEL_OUTOF10: 7

## 2021-09-23 ASSESSMENT — ENCOUNTER SYMPTOMS
EYE PAIN: 0
ABDOMINAL PAIN: 0
DIARRHEA: 0
EYE REDNESS: 0
VOMITING: 0
SHORTNESS OF BREATH: 0
COUGH: 0
BACK PAIN: 0
NAUSEA: 0
COLOR CHANGE: 0
TROUBLE SWALLOWING: 0
SORE THROAT: 0

## 2021-09-23 ASSESSMENT — PAIN DESCRIPTION - PAIN TYPE: TYPE: ACUTE PAIN

## 2021-09-23 ASSESSMENT — PAIN DESCRIPTION - FREQUENCY: FREQUENCY: CONTINUOUS

## 2021-09-23 ASSESSMENT — PAIN DESCRIPTION - DESCRIPTORS: DESCRIPTORS: ACHING

## 2021-09-23 NOTE — ED PROVIDER NOTES
[facility] ED  eMERGENCY dEPARTMENT eNCOUnter      Pt Name: Augie Boudreaux  MRN: [id number]  [place] 1970  Date of evaluation: 9/23/2021  Provider: Christina Austin MD    CHIEF COMPLAINT       Chief Complaint   Patient presents with    URI     Patient arrives to ER today with complaints of congestion, body aches, lower back pain and urinary frequency.  Urinary Frequency     And is a 68-year-old female who presents to the emergency department complaining of feeling achy all over. She states she just has generalized body aches and has had a head cold and congestion. She denies any vomiting or diarrhea. She denies any loss of taste or smell. She states she tried to get a Covid test today but was unable to get it done. She denies any chest or abdominal pain. She states that sometimes she is not peeing as much as she thought she normally would but denies any burning or dysuria. Nursing Notes were reviewed. REVIEW OF SYSTEMS    (2-9 systems for level 4, 10 or more for level 5)     Review of Systems   Constitutional: Positive for fatigue and fever. Negative for chills. HENT: Negative for ear pain, sore throat and trouble swallowing. Eyes: Negative for pain and redness. Respiratory: Negative for cough and shortness of breath. Cardiovascular: Negative for chest pain and palpitations. Gastrointestinal: Negative for abdominal pain, diarrhea, nausea and vomiting. Genitourinary: Negative for dysuria and frequency. Musculoskeletal: Positive for myalgias. Negative for back pain, neck pain and neck stiffness. Skin: Negative for color change and rash. Neurological: Positive for headaches. Negative for dizziness and syncope. Psychiatric/Behavioral: Negative for hallucinations and suicidal ideas. Except as noted above the remainder of the review of systems was reviewed and negative.        PAST MEDICAL HISTORY     Past Medical History:   Diagnosis Date    Anxiety     Fibromyalgia     Pancreatitis          SURGICAL HISTORY       Past Surgical History:   Procedure Laterality Date    ERCP      HEMORRHOID SURGERY      HYSTERECTOMY      TUBAL LIGATION           ALLERGIES     Morphine, Topiramate, Depakote [valproic acid], Naproxen, Sulfamethoxazole-trimethoprim, and Vicodin [hydrocodone-acetaminophen]    FAMILY HISTORY       Family History   Problem Relation Age of Onset    Diabetes Mother     Heart Disease Father     Diabetes Father           SOCIAL HISTORY       Social History     Socioeconomic History    Marital status: Legally      Spouse name: None    Number of children: None    Years of education: None    Highest education level: None   Occupational History    None   Tobacco Use    Smoking status: Current Every Day Smoker     Packs/day: 0.50    Smokeless tobacco: Never Used   Substance and Sexual Activity    Alcohol use: Yes     Comment: Occasionally    Drug use: Not Currently    Sexual activity: None   Other Topics Concern    None   Social History Narrative    None     Social Determinants of Health     Financial Resource Strain:     Difficulty of Paying Living Expenses:    Food Insecurity:     Worried About Running Out of Food in the Last Year:     Ran Out of Food in the Last Year:    Transportation Needs:     Lack of Transportation (Medical):      Lack of Transportation (Non-Medical):    Physical Activity:     Days of Exercise per Week:     Minutes of Exercise per Session:    Stress:     Feeling of Stress :    Social Connections:     Frequency of Communication with Friends and Family:     Frequency of Social Gatherings with Friends and Family:     Attends Sabianism Services:     Active Member of Clubs or Organizations:     Attends Club or Organization Meetings:     Marital Status:    Intimate Partner Violence:     Fear of Current or Ex-Partner:     Emotionally Abused:     Physically Abused:     Sexually Abused:            PHYSICAL EXAM    (up to 7 for level 4, 8 ormore for level 5)     ED Triage Vitals [09/23/21 1901]   BP Temp Temp Source Pulse Resp SpO2 Height Weight   (!) 140/86 98.6 °F (37 °C) Oral 78 16 100 % 5' 7\" (1.702 m) 145 lb (65.8 kg)       Physical Exam     Physical    Vital signs and nursing notes were reviewed as well as the social, family, and past medical history. Gen. appearance: Patient is alert and oriented and in no acute distress    Head: Atraumatic, normocephalic    Neck: Supple, trachea/thyroid normal, no meningismus    EENT: PERRLA, EOMI, conjunctiva normal.    Skin: Warm and dry with no rash    Cardiovascular: Heart RRR, no gallops or rubs, no aortic enlargement or bruits noted. Respiratory: Lungs clear, no wheezing, no rales, normal breath sounds. Gastrointestinal: Abdomen nontender, bowel sounds normal, no rebound/guarding/distention or mass    Musculoskeletal: No tenderness in the extremities, no back or hip pain. Neurological: Patient is alert and oriented ×3, no focal motor or sensory deficits noted, reflexes normal, NIH = 0      DIAGNOSTIC RESULTS              LABS:  Labs Reviewed   COVID-19, RAPID - Abnormal; Notable for the following components:       Result Value    SARS-CoV-2, Rapid DETECTED (*)     All other components within normal limits   URINALYSIS - Abnormal; Notable for the following components:    Urine Hgb TRACE (*)     Protein, UA TRACE (*)     All other components within normal limits   MICROSCOPIC URINALYSIS       All other labs were within normal range or not returned as of this dictation. EMERGENCY DEPARTMENT COURSE and DIFFERENTIAL DIAGNOSIS/MDM:   Vitals:    Vitals:    09/23/21 1901   BP: (!) 140/86   Pulse: 78   Resp: 16   Temp: 98.6 °F (37 °C)   TempSrc: Oral   SpO2: 100%   Weight: 145 lb (65.8 kg)   Height: 5' 7\" (1.702 m)                 REASSESSMENT      Patient's Covid test was positive. Her urinalysis was negative.   I discussed the results with the patient and answered questions and we will discharge home and patient will isolate herself and return if any worsening.     PROCEDURES:  Unless otherwise noted below, none     Procedures    FINAL IMPRESSION      1. COVID-19          DISPOSITION/PLAN   DISPOSITION Decision To Discharge 09/23/2021 08:12:41 PM      PATIENT REFERRED TO:  Mesfin Leonard, [facility]  [address] [address]  [phone]    In 2 days        DISCHARGE MEDICATIONS:  New Prescriptions    No medications on file          (Please note that portions ofthis note were completed with a voice recognition program.  Efforts were made to edit the dictations but occasionally words are mis-transcribed.)    Luis Beatty MD(electronically signed)  Attending Emergency Physician            Luis Beatty MD  09/23/21 2013

## 2021-09-24 ENCOUNTER — CARE COORDINATION (OUTPATIENT)
Dept: CARE COORDINATION | Age: 51
End: 2021-09-24

## 2021-09-24 NOTE — CARE COORDINATION
Ambulatory Care Coordination Note  9/24/2021  CM Risk Score: 0  [place] 10 Year Mortality Risk Score: 4%     ACC: Bina Bailey, RN    Summary Note: ED Follow Up Call/ COVID Monitoring call made to patient today. Patient answered. Writer states name, title and reason for call. Patient states that this is the 2nd person to call about ED follow up today from [place]. Writer gives apologies. Call ended.

## 2022-12-13 ENCOUNTER — TELEPHONE (OUTPATIENT)
Dept: PHARMACY | Facility: CLINIC | Age: 52
End: 2022-12-13

## 2022-12-13 NOTE — TELEPHONE ENCOUNTER
[facility] [place] [facility]: ADHERENCE REVIEW  Identified care gap per [place]: fills at [address] : Statin adherence    Last Visit: outside provider    [address] Records claims through 12.5.22  YTD [place] =  83%; Potential Fail Date: 12/18/22 ):   Lisinopril 10mg Next refill due: 12/10/22    30 day supply    Per [facility]:   Did not contact. BP Readings from Last 3 Encounters:   09/23/21 (!) 140/86   07/27/21 (!) 122/91   05/19/21 (!) 127/93     CrCl cannot be calculated (Patient's most recent lab result is older than the maximum 180 days allowed. ). PLAN  The following are interventions that have been identified:   - Patient overdue refilling Lisinopril 10mg. Unsure if patient is still prescribed this medication. Both phone listed are disconnected. No future appointments. Yuridia Crum CPhT.    [facility] free: [place] [place] Only    CPA in place:  No  Gap Closed?: No   Time Spent (min): 15